# Patient Record
Sex: FEMALE | Race: WHITE
[De-identification: names, ages, dates, MRNs, and addresses within clinical notes are randomized per-mention and may not be internally consistent; named-entity substitution may affect disease eponyms.]

---

## 2018-06-12 ENCOUNTER — HOSPITAL ENCOUNTER (EMERGENCY)
Dept: HOSPITAL 62 - ER | Age: 23
Discharge: HOME | End: 2018-06-12
Payer: OTHER GOVERNMENT

## 2018-06-12 VITALS — DIASTOLIC BLOOD PRESSURE: 82 MMHG | SYSTOLIC BLOOD PRESSURE: 135 MMHG

## 2018-06-12 DIAGNOSIS — R10.2: Primary | ICD-10-CM

## 2018-06-12 LAB
APPEARANCE UR: CLEAR
APTT PPP: YELLOW S
BILIRUB UR QL STRIP: NEGATIVE
GLUCOSE UR STRIP-MCNC: NEGATIVE MG/DL
KETONES UR STRIP-MCNC: NEGATIVE MG/DL
NITRITE UR QL STRIP: NEGATIVE
PH UR STRIP: 5 [PH] (ref 5–9)
PROT UR STRIP-MCNC: NEGATIVE MG/DL
SP GR UR STRIP: 1.02
UROBILINOGEN UR-MCNC: NEGATIVE MG/DL (ref ?–2)

## 2018-06-12 PROCEDURE — 72170 X-RAY EXAM OF PELVIS: CPT

## 2018-06-12 PROCEDURE — 81001 URINALYSIS AUTO W/SCOPE: CPT

## 2018-06-12 PROCEDURE — 81025 URINE PREGNANCY TEST: CPT

## 2018-06-12 PROCEDURE — 99284 EMERGENCY DEPT VISIT MOD MDM: CPT

## 2018-06-12 NOTE — ER DOCUMENT REPORT
HPI





- HPI


Pain Level: 2


Context: 





Patient is a 22-year-old female presents emergency room with a chief complaint 

of 4 years of pubic symphysis pain.  Patient states it is worse with certain 

positions where she strained herself during stretching activity.  She describes 

it as heavy on the pubic symphysis area that is tender to touch and worse with 

hip abduction.  She is never followed up with her primary care provider 

regarding this.  She follows with [facility]





- REPRODUCTIVE


LMP: 06/01/18





- DERM


Skin Color: Normal





Past Medical History





- Social History


Smoking Status: Never Smoker


Chew tobacco use (# tins/day): No


Frequency of alcohol use: None


Drug Abuse: None


Family History: Reviewed & Not Pertinent


Patient has suicidal ideation: No


Patient has homicidal ideation: No


Renal/ Medical History: Denies: Hx Peritoneal Dialysis





[facility] Provider Document





- CONSTITUTIONAL


Agree With Documented VS: Yes


Notes: 





PHYSICAL EXAM


GENERAL: Alert, interacts well. 


ABDOMEN: Soft,  nondistended, nontender. No guarding, rebound, or rigidity.. 

Bowel sounds present in all 4 quadrants.


EXTREMITIES: Moves all 4 extremities spontaneously. No edema, radial and 

dorsalis pedis pulses 2/4 bilaterally. No cyanosis.  Pelvis stable hips 

nontender gait stable and normal without limp.  Pubic symphysis tenderness


NEUROLOGICAL: Alert and oriented x4. Normal speech.


PSYCH: Normal affect, normal mood.


SKIN: Warm, dry, normal turgor. No rashes or lesions noted.








- INFECTION CONTROL


TRAVEL OUTSIDE OF THE [place] IN LAST 30 DAYS: No





Course





- Re-evaluation


Re-evalutation: 





06/12/18 17:58


Patient is a 22-year-old female is hemodynamically stable, no acute distress 

afebrile.  Pelvic exam stable with presentation and exam consistent with 

osteitis pubis.  At this time there is no evidence of osteomyelitis given the 

patient has been having symptoms of this for 4 years, vital signs are stable 

and afebrile.  Pelvis x-ray negative for any evidence of underlying fracture.  

Encouraged patient to follow-up with her primary care physician.











- Vital Signs


Vital signs: 


 











Temp Pulse Resp BP Pulse Ox


 


 99.0 F   84   16   154/88 H  100 


 


 06/12/18 15:57  06/12/18 15:57  06/12/18 15:57  06/12/18 15:57  06/12/18 15:57














Discharge





- Discharge


Clinical Impression: 


 Pubic bone pain





Condition: Good


Disposition: HOME, SELF-CARE


Additional Instructions: 


Your presentation today is consistent with the diagnosis called osteitis pubis 

which is inflammation of the pubic bone medially points in the front of your 

pelvis.  This can be treated with anti-inflammatories such as Motrin or Aleve 

and physical therapy.

## 2018-06-12 NOTE — RADIOLOGY REPORT (SQ)
EXAM DESCRIPTION:  PELVIS AP



COMPLETED DATE/TIME:  6/12/2018 5:30 pm



REASON FOR STUDY:  pubic symphis pain x4 years, no trauma



COMPARISON:  None.



NUMBER OF VIEWS:  One view



TECHNIQUE:  AP Pelvis



LIMITATIONS:  None.



FINDINGS:  MINERALIZATION: Normal.

HIPS: No acute fracture or dislocation. No worrisome bone lesions.

PELVIS AND SACRUM: No acute fracture or dislocation. No worrisome bone lesions.

PUBIS AND ISCHIUM: No acute fracture.

LOWER LUMBAR SPINE: No significant findings as visualized.

SOFT TISSUES: No findings.

OTHER: No other significant finding.



IMPRESSION:  NEGATIVE STUDY OF THE PELVIS.



TECHNICAL DOCUMENTATION:  JOB ID:  [id number]

 2011 [employer]- All Rights Reserved



Reading location - IP/workstation name: [email or web address]

## 2018-06-12 NOTE — ER DOCUMENT REPORT
ED Medical Screen (RME)





- General


Chief Complaint: Pelvic Pain


Stated Complaint: PELVIC PAIN


Time Seen by Provider: 06/12/18 16:26


TRAVEL OUTSIDE OF THE [place] IN LAST 30 DAYS: No





- HPI


Notes: 





06/12/18 16:29


Pelvic pain worse with movement ongoing for 4 years worse in the last 24 hours





- Related Data


Allergies/Adverse Reactions: 


 





Penicillins Allergy (Verified 06/12/18 15:52)


 











Review of Systems





- Review of Systems


Female Genitourinary: Other - Pelvic pain





Physical Exam





- Vital signs


Vitals: 





 











Temp Pulse Resp BP Pulse Ox


 


 99.0 F   84   16   154/88 H  100 


 


 06/12/18 15:57  06/12/18 15:57  06/12/18 15:57  06/12/18 15:57  06/12/18 15:57














- General


General appearance: Appears well


In distress: None





Course





- Vital Signs


Vital signs: 





 











Temp Pulse Resp BP Pulse Ox


 


 99.0 F   84   16   154/88 H  100 


 


 06/12/18 15:57  06/12/18 15:57  06/12/18 15:57  06/12/18 15:57  06/12/18 15:57














Doctor's Discharge





- Discharge


Referrals: 


LEIF HALL NP [Primary Care Provider] - Follow up as needed